# Patient Record
Sex: FEMALE | Race: WHITE | NOT HISPANIC OR LATINO | Employment: STUDENT | URBAN - METROPOLITAN AREA
[De-identification: names, ages, dates, MRNs, and addresses within clinical notes are randomized per-mention and may not be internally consistent; named-entity substitution may affect disease eponyms.]

---

## 2024-03-10 ENCOUNTER — HOSPITAL ENCOUNTER (EMERGENCY)
Facility: HOSPITAL | Age: 24
Discharge: HOME | End: 2024-03-11
Payer: COMMERCIAL

## 2024-03-10 ENCOUNTER — APPOINTMENT (OUTPATIENT)
Dept: RADIOLOGY | Facility: HOSPITAL | Age: 24
End: 2024-03-10
Payer: COMMERCIAL

## 2024-03-10 DIAGNOSIS — S06.0X0A CONCUSSION WITHOUT LOSS OF CONSCIOUSNESS, INITIAL ENCOUNTER: ICD-10-CM

## 2024-03-10 DIAGNOSIS — S09.90XA TRAUMATIC INJURY OF HEAD, INITIAL ENCOUNTER: Primary | ICD-10-CM

## 2024-03-10 DIAGNOSIS — S20.224A CONTUSION OF MIDDLE BACK WALL OF THORAX, INITIAL ENCOUNTER: ICD-10-CM

## 2024-03-10 PROCEDURE — 70450 CT HEAD/BRAIN W/O DYE: CPT

## 2024-03-10 PROCEDURE — 2500000005 HC RX 250 GENERAL PHARMACY W/O HCPCS: Performed by: NURSE PRACTITIONER

## 2024-03-10 PROCEDURE — 72128 CT CHEST SPINE W/O DYE: CPT | Performed by: SURGERY

## 2024-03-10 PROCEDURE — 72125 CT NECK SPINE W/O DYE: CPT

## 2024-03-10 PROCEDURE — 72125 CT NECK SPINE W/O DYE: CPT | Performed by: SURGERY

## 2024-03-10 PROCEDURE — 72128 CT CHEST SPINE W/O DYE: CPT

## 2024-03-10 PROCEDURE — 99285 EMERGENCY DEPT VISIT HI MDM: CPT | Mod: 25

## 2024-03-10 PROCEDURE — 70450 CT HEAD/BRAIN W/O DYE: CPT | Performed by: SURGERY

## 2024-03-10 RX ORDER — ONDANSETRON 4 MG/1
4 TABLET, ORALLY DISINTEGRATING ORAL ONCE
Status: COMPLETED | OUTPATIENT
Start: 2024-03-10 | End: 2024-03-10

## 2024-03-10 RX ORDER — ACETAMINOPHEN 325 MG/1
975 TABLET ORAL ONCE
Status: COMPLETED | OUTPATIENT
Start: 2024-03-10 | End: 2024-03-10

## 2024-03-10 RX ADMIN — ACETAMINOPHEN 975 MG: 325 TABLET ORAL at 21:03

## 2024-03-10 RX ADMIN — ONDANSETRON 4 MG: 4 TABLET, ORALLY DISINTEGRATING ORAL at 21:03

## 2024-03-10 ASSESSMENT — PAIN DESCRIPTION - FREQUENCY: FREQUENCY: CONSTANT/CONTINUOUS

## 2024-03-10 ASSESSMENT — PAIN DESCRIPTION - LOCATION: LOCATION: HEAD

## 2024-03-10 ASSESSMENT — COLUMBIA-SUICIDE SEVERITY RATING SCALE - C-SSRS
2. HAVE YOU ACTUALLY HAD ANY THOUGHTS OF KILLING YOURSELF?: NO
1. IN THE PAST MONTH, HAVE YOU WISHED YOU WERE DEAD OR WISHED YOU COULD GO TO SLEEP AND NOT WAKE UP?: NO
6. HAVE YOU EVER DONE ANYTHING, STARTED TO DO ANYTHING, OR PREPARED TO DO ANYTHING TO END YOUR LIFE?: NO

## 2024-03-10 ASSESSMENT — VISUAL ACUITY: OU: 1

## 2024-03-10 ASSESSMENT — PAIN SCALES - GENERAL: PAINLEVEL_OUTOF10: 5 - MODERATE PAIN

## 2024-03-10 ASSESSMENT — PAIN DESCRIPTION - PAIN TYPE: TYPE: ACUTE PAIN

## 2024-03-10 ASSESSMENT — PAIN - FUNCTIONAL ASSESSMENT: PAIN_FUNCTIONAL_ASSESSMENT: 0-10

## 2024-03-10 ASSESSMENT — PAIN DESCRIPTION - PROGRESSION: CLINICAL_PROGRESSION: NOT CHANGED

## 2024-03-11 VITALS
OXYGEN SATURATION: 99 % | BODY MASS INDEX: 19.29 KG/M2 | DIASTOLIC BLOOD PRESSURE: 84 MMHG | HEART RATE: 56 BPM | WEIGHT: 120 LBS | SYSTOLIC BLOOD PRESSURE: 122 MMHG | TEMPERATURE: 98.1 F | HEIGHT: 66 IN | RESPIRATION RATE: 15 BRPM

## 2024-03-11 NOTE — DISCHARGE INSTRUCTIONS
"PATIENT INSTRUCTIONS - CONCUSSION    What is a concussion? -- A concussion is a mild brain injury that can cause confusion, memory loss, and headache. Sometimes people pass out (lose consciousness) when they have a concussion, but not always.    A concussion can happen after a person has an injury to the head from being hit or falling.    What are the symptoms of a concussion? -- Symptoms that can happen minutes to hours after a concussion include:  ?Memory loss - People sometimes forget what caused their injury, as well as what happened right before and after the injury.  ?Confusion  ?Headache  ?Dizziness or trouble with balance  ?Nausea or vomiting  ?Feeling sleepy  ?Acting cranky, strangely, or out of sorts    Symptoms that can happen hours to days after a concussion include:  ?Trouble walking or talking  ?Memory problems or problems paying attention  ?Trouble sleeping  ?Mood or behavior changes  ?Vision changes  ?Being bothered by noise or light    Will I need tests? -- It depends on your injury and symptoms. To check if you have a concussion, your doctor will ask about your symptoms and do an exam. He or she will also ask you questions to check that you are thinking clearly.    If your doctor suspects a serious injury, he or she might order an imaging test of the brain, such as a CT or MRI scan. These tests create pictures of the skull and inside of the brain.    How is a concussion treated? -- A concussion does not usually need treatment. Most concussions get better on their own, but it can take time. Some people's symptoms go away within minutes to hours. Other people have symptoms for weeks to months. When symptoms last a long time, doctors call it \"postconcussion syndrome.\"    After your concussion, your doctor might recommend that someone watch you for 12 to 24 hours. This person should watch for symptoms.    To help your brain heal after a concussion, you can:  ?Rest your body - Make sure to get plenty of " sleep. Avoid heavy exercise or too much physical activity if it makes you feel worse.  ?Rest your brain - Avoid doing activities that need concentration or a lot of attention if they make you feel worse.  ?Not drink alcohol while you are still having symptoms of concussion  ?Take a pain-relieving medicine, if you have a headache - You can choose one with acetaminophen (sample brand name: Tylenol) or ibuprofen (sample brand names: Advil, Motrin).    When can I play sports or do my usual activities again? -- Ask your doctor when you can play sports or do your usual activities again. It will depend on your injury and symptoms, as well as the type of sport you play. Do not go back to playing on the same day as your injury.    It's important to let your brain heal completely after a concussion. Getting another concussion before your brain has healed may lead to serious brain problems.   When should I call the doctor or nurse? -- Call the doctor or nurse if any of the following happen after a concussion:  ?You vomit more than 3 times  ?You have a severe headache, or a headache that gets worse  ?You have a seizure  ?You have trouble walking or talking  ?Your vision changes  ?You feel weak or numb in part of your body  ?You lose control over your bladder or bowel    If your doctor suggested that someone watch you after your concussion, this person should call the doctor or nurse if he or she:  ?Can't wake you up  ?Sees any of the symptoms listed above  How can I prevent another concussion? -- To help prevent another concussion, you can:  ?Wear a helmet when you ride a bike or motorcycle, or play certain sports  ?Wear a seat belt when you drive or ride in a car    If you have one concussion, it's very important to try to prevent future concussions. Having many concussions might cause long-term brain damage and affect your thinking.    INFORMATION FROM UP TO DATE - ALL RIGHTS RESERVED    BRUISE PATIENT INSTRUCTIONS:    What  is a bruise?  A bruise, also known as a contusion, is a traumatic injury to the skin or underlying tissues. Bruises may occur after an accident, such as a fall, or bumping into or being struck by a blunt object. Because the outer skin is not cut or broken, there is no external bleeding. However, damage occurs to blood vessels underneath the skin, causing them to rupture and leak blood. This blood collects or pools underneath the skin.   After a blood vessel is injured, platelets in the blood collect at the site of the injury to form a plug. The platelets combine with certain proteins called clotting factors to form a fibrin clot. This clot helps to prevent blood from leaking from the blood vessel and holds the platelets together so healing can begin.  As the blood coagulates (clots together), the skin above the injured area will look discolored. At first, the skin is often red or purplish in color, but later on, as the bruise heals, it may turn brown, green or yellow. This discoloration is what is commonly called a black-and-blue karen. Other symptoms might include swelling, tenderness or pain in the area.   Types of bruises  An ecchymosis (plural: ecchymoses) is a flat, purple-colored bruise caused when blood leaks into the top layers of the skin.   A hematoma is a mass of clotted or coagulated blood. It differs from a simple bruise or contusion because the area becomes swollen, raised or painful. Hematomas may occur after an injury or impact to the skin, but they can also develop without any apparent cause. If the hematoma develops in a vital organ, the condition may become very serious and it will require medical attention.   What causes bruises?   Minor accidents, such as bumping into a piece of furniture, falling, or dropping a heavy object on your foot or hand may cause a bruise to form at the site of impact.   Older adults tend to bruise more easily than younger people. This is because their skin is thinner  and they have less fat deposited underneath their skin to provide a cushioning effect. The blood vessels tend to break more readily after a minor injury.   In general, women tend to bruise more easily than men do.   The use of certain medications, such as anti-coagulants, also known as blood thinners, can increase the tendency to bruise. Aspirin may have the same effect.   Certain bleeding disorders, such as hemophilia and Von Willebrand’s disease, are associated with a tendency to bruise more easily. These conditions are caused by an absence of certain clotting factors (proteins) in the blood. Hemophilia is a relatively rare condition that is usually inherited and mostly affects males. Von Willebrand’s disease is the most common type of bleeding disorder in the [place] and affects both males and females.   A vitamin deficiency can also result in a greater tendency to develop bruises. Vitamins B12, C and K, or folic acid play a role in the blood’s ability to clot.   Bruises may be a sign of domestic, child or elder abuse, especially if an individual has multiple or recurrent bruises. In cases of suspected abuse, a healthcare provider or  may question the individual or caregiver about the cause of the injuries. The victim might need to be placed in a safe environment or the abuser might have to be removed from the home to prevent further harm to the person.   How are bruises diagnosed?  Your doctor will be able to tell whether you have a bruise simply by looking for skin discoloration. In some cases, there may be a bone fracture that results in discoloration. If your doctor suspects you have a fracture, he or she will probably order X-rays of the injured area. Bruises that have no apparent cause may be a sign of a blood or bleeding disorder, a vitamin deficiency, or another underlying condition. In these cases, you will need to undergo blood tests to determine the cause. If you are taking an anti-coagulant  medication, your blood should be monitored periodically to ensure that the medication does not exceed the recommended level.   How are bruises treated?   Treatment will depend on the underlying cause. Bruises due to minor injuries or accidents will often disappear on their own after a few weeks. During the healing process, the bruise may change color, turning from red or dark purple, to yellow, brown or green, before fading away.   Apply an ice pack during the first 24 to 48 hours after the bruise develops. The ice pack should be removed after 15 minutes. Prevent direct contact between the ice pack and the injured area by placing a towel or cloth between the ice pack and the skin.   Resting and elevating an injured limb can also help to reduce pain and swelling.   After two days, begin applying a heat pack or cloth soaked in warm water to the injured area several times each day.   Acetaminophen, an over-the-counter pain medication, may also be taken to relieve pain or discomfort.   Bruises that do not go away after a few weeks or recur without any apparent cause may require medical attention.   If you suspect that you have a broken bone or fracture, you should see your doctor immediately or visit the emergency room.   How can bruises be prevented?   Bruises are a common problem even among healthy children and adults. Although they cannot totally be prevented, you can reduce your risk of bruises.   Wear helmets or other protective equipment when playing contact sports or riding a motorcycle.   Keep floors and walkways clear of obstructions (such as throw rugs) that might cause you to slip and fall.   Avoid placing furniture near doorways or walkways where you might bump into it.   Carry a flashlight when walking through poorly lit areas to reduce the risk accidental falls.   Keep a nightlight on in case you need to get up during the night to use the bathroom.   Make sure your diet contains adequate amounts of  vitamins B12, C, and K and folic acid.   References:  American Society of Hemat[employer]. Bleeding Disorders   Health Guidance. What is a hematoma? .   [employer]. Muscle Contusions (Bruises) .     INFORMATION FROM UP TO DATE - ALL RIGHTS RESERVED